# Patient Record
Sex: FEMALE | Race: BLACK OR AFRICAN AMERICAN | Employment: OTHER | URBAN - METROPOLITAN AREA
[De-identification: names, ages, dates, MRNs, and addresses within clinical notes are randomized per-mention and may not be internally consistent; named-entity substitution may affect disease eponyms.]

---

## 2022-07-19 PROBLEM — E66.3 OVERWEIGHT: Status: ACTIVE | Noted: 2020-02-21

## 2022-10-18 RX ORDER — LOSARTAN POTASSIUM 50 MG/1
1 TABLET ORAL NIGHTLY
COMMUNITY

## 2022-10-18 RX ORDER — HYDROXYZINE HYDROCHLORIDE 25 MG/1
25 TABLET, FILM COATED ORAL NIGHTLY
COMMUNITY
Start: 2022-08-17

## 2022-10-18 RX ORDER — LANOLIN ALCOHOL/MO/W.PET/CERES
3 CREAM (GRAM) TOPICAL NIGHTLY
COMMUNITY
Start: 2022-08-17

## 2022-10-18 NOTE — PROGRESS NOTES
[facility] PRE-SURGICAL TESTING INSTRUCTIONS                      PRIOR TO PROCEDURE DATE:    1. PLEASE FOLLOW ANY INSTRUCTIONS GIVEN TO YOU PER YOUR SURGEON. 2. Arrange for someone to drive you home and be with you for the first 24 hours after discharge for your safety after your procedure for which you received sedation. Ensure it is someone we can share information with regarding your discharge. NOTE: At this time ONLY 2 ADULTS may accompany you & everyone must be MASKED. 3. You must contact your surgeon for instructions IF:  You are taking any blood thinners, aspirin, anti-inflammatory or vitamins. There is a change in your physical condition such as a cold, fever, rash, cuts, sores, or any other infection, especially near your surgical site. 4. Do not drink alcohol the day before or day of your procedure. Do not use any recreational marijuana at least 24 hours or street drugs (heroin, cocaine) at minimum 5 days prior to your procedure. 5. A Pre-Surgical History and Physical MUST be completed WITHIN 30 DAYS OR LESS prior to your procedure. by your Physician or an Urgent Care        THE DAY OF YOUR PROCEDURE:  1. Follow instructions for ARRIVAL TIME as DIRECTED BY YOUR SURGEON. 2. Enter the MAIN entrance from [employer] and follow the signs to the free Parking Garage or [employer] (offered free of charge 7 am-5pm). 3. Enter the Main Entrance of the hospital (do not enter from the lower level of the parking garage). Upon entrance, check in with the  at the surgical information desk on your LEFT. Bring your insurance card and photo ID to register      4. DO NOT EAT ANYTHING 8 hours prior to arrival for surgery. You may have up to 8 ounces of water 4 hours prior to your arrival for surgery.    NOTE: ALL Gastric, Bariatric & Bowel surgery patients - you MUST follow your surgeon's instructions regarding eating/ drinking as you will have very specific instructions to follow. If you did not receive these, call your surgeon's office immediately. 5. MEDICATIONS:  Take the following medications with a SMALL sip of water: NORVASC, VICODIN OR FLEXERIL IF NEEDED bring inhaler  Use your usual dose of inhalers the morning of surgery. BRING your rescue inhaler with you to hospital.   Anesthesia does NOT want you to take insulin the morning of surgery. They will control your blood sugar while you are at the hospital. Please contact your ordering physician for instructions regarding your insulin the night before your procedure. If you have an insulin pump, please keep it set on basal rate. Bariatric patient's call your surgeon if on diabetic medications as some may need to be stopped 1 week prior to surgery    6. Do not swallow additional water when brushing teeth. No gum, candy, mints, or ice chips. Refrain from smoking or at least decrease the amount on day of surgery. 7. Morning of surgery:   Take a shower with an antibacterial soap (i.e., Safeguard or Dial) OR your physician may have instructed you to use Hibiclens. Dress in loose, comfortable clothing appropriate for redressing after your procedure. Do not wear jewelry (including body piercings), make-up (especially NO eye make-up), fingernail polish (NO toenail polish if foot/leg surgery), lotion, powders, or metal hairclips. Do not shave or wax for 72 hours prior to procedure near your operative site. Shaving with a razor can irritate your skin and make it easier to develop an infection. On the day of your procedure, any hair that needs to be removed near the surgical site will be 'clipped' by a healthcare worker using a special clipper designed to avoid skin irritation. 8. Dentures, glasses, or contacts will need to be removed before your procedure. Bring cases for your glasses, contacts, dentures, or hearing aids to protect them while you are in surgery.       9. If you use a CPAP, please bring it with you on the day of your procedure. 10. We recommend that valuable personal belongings such as cash, cell phones, e-tablets, or jewelry, be left at home during your stay. The hospital will not be responsible for valuables that are not secured in the hospital safe. However, if your insurance requires a co-pay, you may want to bring a method of payment, i.e., Check or credit card, if you wish to pay your co-pay the day of surgery. 11. If you are to stay overnight, you may bring a bag with personal items. Please have any large items you may need brought in by your family after your arrival to your hospital room. 12. If you have a Living Will or Durable Power of , please bring a copy on the day of your procedure. How we keep you safe and work to prevent surgical site infections:   1. Health care workers should always check your ID bracelet to verify your name and birth date. You will be asked many times to state your name, date of birth, and allergies. 2. Health care workers should always clean their hands with soap or alcohol gel before providing care to you. It is okay to ask anyone if they cleaned their hands before they touch you. 3. You will be actively involved in verifying the type of procedure you are having and ensuring the correct surgical site. This will be confirmed multiple times prior to your procedure. Do NOT hernan your surgery site UNLESS instructed to by your surgeon. 4. When you are in the operating room, your surgical site will be cleansed with a special soap, and in most cases, you will be given an antibiotic before the surgery begins. What to expect AFTER your procedure? 1. Immediately following your procedure, your will be taken to the PACU for the first phase of your recovery. Your nurse will help you recover from any potential side effects of anesthesia, such as extreme drowsiness, changes in your vital signs or breathing patterns.  Nausea, headache, muscle aches, or sore throat may also occur after anesthesia. Your nurse will help you manage these potential side effects. 2. For comfort and safety, arrange to have someone at home with you for the first 24 hours after discharge. 3. You and your family will be given written instructions about your diet, activity, dressing care, medications, and return visits. 4. Once at home, should issues with nausea, pain, or bleeding occur, or should you notice any signs of infection, you should call your surgeon. 5. Always clean your hands before and after caring for your wound. Do not let your family touch your surgery site without cleaning their hands. 6. Narcotic pain medications can cause significant constipation. You may want to add a stool softener to your postoperative medication schedule or speak to your surgeon on how best to manage this SIDE EFFECT. SPECIAL INSTRUCTIONS BRING INHALER AND BRACE, PATIENT ACKNOWLEDGES UNDERSTANDING OF PRE OP INSTRUCTIONS    Thank you for allowing us to care for you. We strive to exceed your expectations in the delivery of care and service provided to you and your family. If you need to contact the [place] [id number] staff for any reason, please call us at [phone]    Instructions reviewed with patient during preadmission testing phone interview.   Ainsley Vega RN.10/18/2022 .11:59 AM      ADDITIONAL EDUCATIONAL INFORMATION REVIEWED PER PHONE WITH YOU AND/OR YOUR FAMILY:  Yes Hibiclens® Bathing Instructions   No Antibacterial Soap

## 2022-10-18 NOTE — PROGRESS NOTES
10/18/2022 1217pm Requested labs done 10/18/2022 from office of Dr. Lawanda Villa [phone] spoke with Kenneth Diaz and she will fax. -db    10/24 Labs received and in [address] [place] [address]. No PT/ PTT or MRSA done. Ordered for DOS and notified Loreto Shay at surgeon office.  Chon Crowley

## 2022-10-18 NOTE — PROGRESS NOTES
Place patient label inside box (if no patient label, complete below)  Name:  :  MR#:   Broadus Felty / PROCEDURE  I (we)Anthony  (Patient Name) authorize German Nuno MD (Provider / Louanne Kayser) and/or such assistants as may be selected by him/her, to perform the following operation/procedure(s): T12-L1, L1-L2 DECOMPRESSION FUSION FIXATION       Note: If unable to obtain consent prior to an emergent procedure, document the emergent reason in the medical record. This procedure has been explained to my (our) satisfaction and included in the explanation was: The intended benefit, nature, and extent of the procedure to be performed; The significant risks involved and the probability of success; Alternative procedures and methods of treatment; The dangers and probable consequences of such alternatives (including no procedure or treatment); The expected consequences of the procedure on my future health; Whether other qualified individuals would be performing important surgical tasks and/or whether  would be present to advise or support the procedure. I (we) understand that there are other risks of infection and other serious complications in the pre-operative/procedural and postoperative/procedural stages of my (our) care. I (we) have asked all of the questions which I (we) thought were important in deciding whether or not to undergo treatment or diagnosis. These questions have been answered to my (our) satisfaction. I (we) understand that no assurance can be given that the procedure will be a success, and no guarantee or warranty of success has been given to me (us). It has been explained to me (us) that during the course of the operation/procedure, unforeseen conditions may be revealed that necessitate extension of the original procedure(s) or different procedure(s) than those set forth in Paragraph 1.  I (we) authorize and request that the above-named physician, his/her assistants or his/her designees, perform procedures as necessary and desirable if deemed to be in my (our) best interest.     Revised 8/2/2021                                                                          Page 1 of 2       I acknowledge that health care personnel may be observing this procedure for the purpose of medical education or other specified purposes as may be necessary as requested and/or approved by my (our) physician. I (we) consent to the disposal by the hospital Pathologist of the removed tissue, parts or organs in accordance with hospital policy. I do ____ do not ____ consent to the use of a local infiltration pain blocking agent that will be used by my provider/surgical provider to help alleviate pain during my procedure. I do ____ do not ____ consent to an emergent blood transfusion in the case of a life-threatening situation that requires blood components to be administered. This consent is valid for 24 hours from the beginning of the procedure. This patient does ____ or does not ____ currently have a DNR status/order. If DNR order is in place, obtain Addendum to the Surgical Consent for ALL Patients with a DNR Order to address jimi-operative status for limited intervention or DNR suspension.      I have read and fully understand the above Consent for Operation/Procedure and that all blanks were completed before I signed the consent.   _____________________________       _____________________      ____/____am/pm  Signature of Patient or legal representative      Printed Name / Relationship            Date / Time   ____________________________       _____________________      ____/____am/pm  Witness to Signature                                    Printed Name                    Date / Time    If patient is unable to sign or is a minor, complete the following)  Patient is a minor, ____ years of age, or unable to sign because: ______________________________________________________________________________________________    If a phone consent is obtained, consent will be documented by using two health care professionals, each affirming that the consenting party has no questions and gives consent for the procedure discussed with the physician/provider.   _____________________          ____________________       _____/_____am/pm   2nd witness to phone consent        Printed name           Date / Time    Informed Consent:  I have provided the explanation described above in section 1 to the patient and/or legal representative.  I have provided the patient and/or legal representative with an opportunity to ask any questions about the proposed operation/procedure.   ___________________________          ____________________         ____/____am/pm  Provider / Proceduralist                            Printed name            Date / Time  Revised 8/2/2021                                                                      Page 2 of 2

## 2022-10-25 ENCOUNTER — ANESTHESIA (OUTPATIENT)
Dept: OPERATING ROOM | Age: 77
End: 2022-10-25
Payer: MEDICARE

## 2022-10-25 ENCOUNTER — HOSPITAL ENCOUNTER (OUTPATIENT)
Age: 77
Setting detail: SURGERY ADMIT
Discharge: HOME | End: 2022-10-25
Attending: NEUROLOGICAL SURGERY | Admitting: NEUROLOGICAL SURGERY
Payer: MEDICARE

## 2022-10-25 ENCOUNTER — ANESTHESIA EVENT (OUTPATIENT)
Dept: OPERATING ROOM | Age: 77
End: 2022-10-25
Payer: MEDICARE

## 2022-10-25 VITALS
DIASTOLIC BLOOD PRESSURE: 74 MMHG | RESPIRATION RATE: 18 BRPM | TEMPERATURE: 98.2 F | BODY MASS INDEX: 32.22 KG/M2 | HEART RATE: 55 BPM | HEIGHT: 65 IN | WEIGHT: 193.4 LBS | SYSTOLIC BLOOD PRESSURE: 140 MMHG | OXYGEN SATURATION: 99 %

## 2022-10-25 LAB
ABO/RH: NORMAL
ANTIBODY SCREEN: NORMAL
APTT: 31.1 SEC (ref 23–34.3)
EKG ATRIAL RATE: 45 BPM
EKG DIAGNOSIS: NORMAL
EKG P AXIS: 59 DEGREES
EKG P-R INTERVAL: 172 MS
EKG Q-T INTERVAL: 486 MS
EKG QRS DURATION: 94 MS
EKG QTC CALCULATION (BAZETT): 420 MS
EKG R AXIS: 10 DEGREES
EKG T AXIS: 25 DEGREES
EKG VENTRICULAR RATE: 45 BPM
INR BLD: 1.05 (ref 0.87–1.14)
PROTHROMBIN TIME: 13.7 SEC (ref 11.7–14.5)

## 2022-10-25 PROCEDURE — 86900 BLOOD TYPING SEROLOGIC ABO: CPT

## 2022-10-25 PROCEDURE — 85610 PROTHROMBIN TIME: CPT

## 2022-10-25 PROCEDURE — 86850 RBC ANTIBODY SCREEN: CPT

## 2022-10-25 PROCEDURE — 93010 ELECTROCARDIOGRAM REPORT: CPT | Performed by: INTERNAL MEDICINE

## 2022-10-25 PROCEDURE — 85730 THROMBOPLASTIN TIME PARTIAL: CPT

## 2022-10-25 PROCEDURE — 87641 MR-STAPH DNA AMP PROBE: CPT

## 2022-10-25 PROCEDURE — 93005 ELECTROCARDIOGRAM TRACING: CPT | Performed by: ANESTHESIOLOGY

## 2022-10-25 PROCEDURE — 86901 BLOOD TYPING SEROLOGIC RH(D): CPT

## 2022-10-25 RX ORDER — SODIUM CHLORIDE 9 MG/ML
INJECTION, SOLUTION INTRAVENOUS PRN
Status: DISCONTINUED | OUTPATIENT
Start: 2022-10-25 | End: 2022-10-25 | Stop reason: HOSPADM

## 2022-10-25 RX ORDER — LIDOCAINE HYDROCHLORIDE 10 MG/ML
1 INJECTION, SOLUTION EPIDURAL; INFILTRATION; INTRACAUDAL; PERINEURAL
Status: DISCONTINUED | OUTPATIENT
Start: 2022-10-25 | End: 2022-10-25 | Stop reason: HOSPADM

## 2022-10-25 RX ORDER — OXYCODONE AND ACETAMINOPHEN 10; 325 MG/1; MG/1
1 TABLET ORAL EVERY 6 HOURS PRN
COMMUNITY

## 2022-10-25 RX ORDER — SODIUM CHLORIDE 0.9 % (FLUSH) 0.9 %
5-40 SYRINGE (ML) INJECTION EVERY 12 HOURS SCHEDULED
Status: DISCONTINUED | OUTPATIENT
Start: 2022-10-25 | End: 2022-10-25 | Stop reason: HOSPADM

## 2022-10-25 RX ORDER — SODIUM CHLORIDE 0.9 % (FLUSH) 0.9 %
5-40 SYRINGE (ML) INJECTION PRN
Status: DISCONTINUED | OUTPATIENT
Start: 2022-10-25 | End: 2022-10-25 | Stop reason: HOSPADM

## 2022-10-25 RX ORDER — SODIUM CHLORIDE, SODIUM LACTATE, POTASSIUM CHLORIDE, CALCIUM CHLORIDE 600; 310; 30; 20 MG/100ML; MG/100ML; MG/100ML; MG/100ML
INJECTION, SOLUTION INTRAVENOUS CONTINUOUS
Status: DISCONTINUED | OUTPATIENT
Start: 2022-10-25 | End: 2022-10-25 | Stop reason: HOSPADM

## 2022-10-25 ASSESSMENT — PAIN DESCRIPTION - DESCRIPTORS: DESCRIPTORS: ACHING

## 2022-10-25 ASSESSMENT — PAIN - FUNCTIONAL ASSESSMENT: PAIN_FUNCTIONAL_ASSESSMENT: 0-10

## 2022-10-25 ASSESSMENT — PAIN SCALES - GENERAL: PAINLEVEL_OUTOF10: 5

## 2022-10-25 NOTE — PROGRESS NOTES
Pt heart rate dropped to 30 when vital signs being assessed. Radial pulse palpated with pulse of 32. Pt also reports tightness in her chest, SOB, and general malaise. Notified Dr. Teresa Garnett of symptoms and he would like EKG and would like to have surgery cancelled. Stat EKG ordered and called placed to cardiology. Dr. Alireza Mark notified.

## 2022-10-25 NOTE — PROGRESS NOTES
OR canceled by Dr Meghna Das for URI symptoms of cough and congestion for 3 days. Patient was also bradycardic with HR down to 32 for a brief time. Charge RN for [facility], Kanika Gale notified of cancellation. Charge for OR, Chiqui and charge anesthesia, Dr Fabi Izaguirre also notified as was DR Rajesh Garcia. VSS now.   Patient discharged to home per order of DR Meghna Das

## 2022-10-25 NOTE — DISCHARGE INSTRUCTIONS
Follow up with cardiologist and primary care physician. May be re-evaluated for surgery again in 4 weeks.

## 2022-10-25 NOTE — ANESTHESIA PRE PROCEDURE
Department of Anesthesiology  Preprocedure Note       Name:  Joie Gallagher   Age:  69 y.o.  :  1945                                          MRN:  [id number]         Date:  10/25/2022      Surgeon: Christelle Vasquez):  James Arriola MD    Procedure: Procedure(s):  T12-L1, L1-L2 DECOMPRESSION FUSION FIXATION    Medications prior to admission:   Prior to Admission medications    Medication Sig Start Date End Date Taking? Authorizing Provider   oxyCODONE-acetaminophen (PERCOCET)  MG per tablet Take 1 tablet by mouth every 6 hours as needed for Pain. Yes Historical Provider, MD   hydrOXYzine HCl (ATARAX) 25 MG tablet Take 25 mg by mouth nightly 22  Yes Historical Provider, MD   melatonin 3 MG TABS tablet Take 3 mg by mouth nightly 22  Yes Historical Provider, MD   losartan (COZAAR) 50 MG tablet Take 1 tablet by mouth at bedtime   Yes Historical Provider, MD   potassium chloride (KLOR-CON) 8 MEQ extended release tablet Take 8 mEq by mouth in the morning and 8 mEq before bedtime. 22   Historical Provider, MD   omeprazole (PRILOSEC) 40 MG delayed release capsule Take 40 mg by mouth daily as needed 9/10/21   Historical Provider, MD   nitroGLYCERIN (NITROSTAT) 0.4 MG SL tablet Place 0.4 mg under the tongue as needed  Patient not taking: Reported on 10/25/2022 5/12/22   Historical Provider, MD   HYDROcodone-acetaminophen (NORCO) 5-325 MG per tablet Take 1 tablet by mouth every 6 hours as needed. Patient not taking: Reported on 10/25/2022    Historical Provider, MD   guaiFENesin (MUCINEX) 600 MG extended release tablet Take 600 mg by mouth 2 times daily as needed    Historical Provider, MD   furosemide (LASIX) 20 MG tablet Take 20 mg by mouth in the morning. 22   Historical Provider, MD   docusate (COLACE, DULCOLAX) 100 MG CAPS Take 100 mg by mouth in the morning.  22   Historical Provider, MD   cyclobenzaprine (FLEXERIL) 10 MG tablet Take 10 mg by mouth 3 times daily as needed 22 Historical Provider, MD   cyanocobalamin 1000 MCG tablet Take 1 tablet by mouth in the morning. Historical Provider, MD   vitamin D (CHOLECALCIFEROL) 25 MCG (1000 UT) TABS tablet Take 25 mcg by mouth daily 5/9/22   Historical Provider, MD   amLODIPine (NORVASC) 10 MG tablet Take 10 mg by mouth in the morning.     Historical Provider, MD   amitriptyline (ELAVIL) 25 MG tablet Take 25 mg by mouth nightly  Patient not taking: Reported on 10/25/2022 2/28/22   Historical Provider, MD   albuterol (PROVENTIL) (2.5 MG/3ML) 0.083% nebulizer solution Inhale 2.5 mg into the lungs every 6 hours as needed 1/24/22   Historical Provider, MD   albuterol sulfate HFA (PROVENTIL;VENTOLIN;PROAIR) 108 (90 Base) MCG/ACT inhaler Inhale 2 puffs into the lungs every 6 hours as needed 5/9/22   Historical Provider, MD   ZINC PO Take by mouth daily    Historical Provider, MD   BIOTIN PO Take 1 tablet by mouth daily    Historical Provider, MD   Ascorbic Acid (VITAMIN C PO) Take by mouth daily    Historical Provider, MD   NONFORMULARY Take by mouth daily [address] Provider, MD   Multiple Vitamin (MULTIVITAMIN PO) Take 1 tablet by mouth daily    Historical Provider, MD   VITAMIN A PO Take by mouth    Historical Provider, MD       Current medications:    Current Facility-Administered Medications   Medication Dose Route Frequency Provider Last Rate Last Admin    ceFAZolin (ANCEF) 2,000 mg in sodium chloride 0.9 % 50 mL IVPB (mini-bag)  2,000 mg IntraVENous Once Fernanda Walls MD        lidocaine PF 1 % injection 1 mL  1 mL IntraDERmal Once PRN Rahel Lee MD        lactated ringers infusion   IntraVENous Continuous Rahel Lee MD        sodium chloride flush 0.9 % injection 5-40 mL  5-40 mL IntraVENous 2 times per day Rahel Lee MD        sodium chloride flush 0.9 % injection 5-40 mL  5-40 mL IntraVENous PRN Rahel Lee MD        0.9 % sodium chloride infusion   IntraVENous PRN Rahel Lee MD           Allergies: Allergies   Allergen Reactions    Atorvastatin Nausea And Vomiting and Nausea Only     Nausea and vomiting      Nsaids Other (See Comments)     Has CKD-3  Has CKD-3    Pravastatin Nausea Only    Simvastatin Nausea Only       Problem List:  There is no problem list on file for this patient.       Past Medical History:        Diagnosis Date    Back pain     Borderline diabetes     Chronic kidney disease     Hypertension     Irregular heart rhythm     Stroke ([facility] [address]     RIGHT EYE- NO RESIDUAL / RESOLVED PER PATIENT    Thyroid disease     Uses walker     &/ OR CANE CURRENTLY DUE TO BACK PAIN    Wears dentures     UPPER ONLY    Wears glasses     READING ONLY       Past Surgical History:        Procedure Laterality Date    CATARACT REMOVAL WITH IMPLANT Bilateral     CERVICAL SPINE SURGERY      X 2 SURGERY - TOTAL OF 5 SCREWS IN NECK PER PATIENT    CHOLECYSTECTOMY      COLONOSCOPY      HYSTERECTOMY (CERVIX STATUS UNKNOWN)      OTHER SURGICAL HISTORY      REMOVAL FATTY TUMOR OFF NECK AND SHOULDER    OTHER SURGICAL HISTORY      FATTY REMOVAL BUTTOCKS        Social History:    Social History     Tobacco Use    Smoking status: Former     Types: Cigarettes     Quit date: 1987     Years since quittin.8    Smokeless tobacco: Never   Substance Use Topics    Alcohol use: Not Currently                                Counseling given: Not Answered      Vital Signs (Current):   Vitals:    10/18/22 1138 10/25/22 0622 10/25/22 0635 10/25/22 0637   BP:  125/84     Pulse:  60 (!) 30 (!) 32   Resp:  18     Temp:  98.2 °F (36.8 °C)     TempSrc:  Temporal     SpO2:  100%     Weight: 195 lb (88.5 kg) 193 lb 6.4 oz (87.7 kg)     Height: 5' 5\" (1.651 m) 5' 5\" (1.651 m)                                                BP Readings from Last 3 Encounters:   10/25/22 125/84       NPO Status:                                                                                 BMI: Wt Readings from Last 3 Encounters:   10/25/22 193 lb 6.4 oz (87.7 kg)     Body mass index is 32.18 kg/m². CBC: No results found for: WBC, RBC, HGB, HCT, MCV, RDW, PLT    CMP: No results found for: NA, K, CL, CO2, BUN, CREATININE, GFRAA, AGRATIO, LABGLOM, GLUCOSE, GLU, PROT, CALCIUM, BILITOT, ALKPHOS, AST, ALT    POC Tests: No results for input(s): POCGLU, POCNA, POCK, POCCL, POCBUN, POCHEMO, POCHCT in the last 72 hours. Coags: No results found for: PROTIME, INR, APTT    HCG (If Applicable): No results found for: PREGTESTUR, PREGSERUM, HCG, HCGQUANT     ABGs: No results found for: PHART, PO2ART, FZD7LIL, POR4RPR, BEART, L3MTWWBC     Type & Screen (If Applicable):  No results found for: LABABO, LABRH    Drug/Infectious Status (If Applicable):  No results found for: HIV, HEPCAB    COVID-19 Screening (If Applicable): No results found for: COVID19        Anesthesia Evaluation  Patient summary reviewed and Nursing notes reviewed no history of anesthetic complications:   Airway:           Dental:          Pulmonary:                             ROS comment: URI currently   Cardiovascular:  Exercise tolerance: good (>4 METS),   (+) hypertension:, dysrhythmias (bradycardia):,                   Neuro/Psych:   (+) CVA:,             GI/Hepatic/Renal:             Endo/Other:                     Abdominal:             Vascular:           Other Findings:           Anesthesia Plan        Teri Heard DO   10/25/2022

## 2022-10-26 LAB — MRSA SCREEN RT-PCR: NORMAL

## 2023-01-26 ENCOUNTER — OFFICE (OUTPATIENT)
Dept: URBAN - METROPOLITAN AREA CLINIC 13 | Facility: CLINIC | Age: 78
End: 2023-01-26
Payer: COMMERCIAL

## 2023-01-26 VITALS — DIASTOLIC BLOOD PRESSURE: 80 MMHG | SYSTOLIC BLOOD PRESSURE: 142 MMHG | WEIGHT: 198 LBS | HEIGHT: 65 IN

## 2023-01-26 DIAGNOSIS — R93.3 ABNORMAL FINDINGS ON DIAGNOSTIC IMAGING OF OTHER PARTS OF DI: ICD-10-CM

## 2023-01-26 PROCEDURE — 99214 OFFICE O/P EST MOD 30 MIN: CPT | Performed by: INTERNAL MEDICINE

## 2023-02-08 ENCOUNTER — AMBULATORY SURGICAL CENTER (OUTPATIENT)
Dept: URBAN - METROPOLITAN AREA SURGERY 4 | Facility: SURGERY | Age: 78
End: 2023-02-08
Payer: COMMERCIAL

## 2023-02-08 VITALS
DIASTOLIC BLOOD PRESSURE: 67 MMHG | SYSTOLIC BLOOD PRESSURE: 130 MMHG | SYSTOLIC BLOOD PRESSURE: 126 MMHG | RESPIRATION RATE: 20 BRPM | SYSTOLIC BLOOD PRESSURE: 159 MMHG | SYSTOLIC BLOOD PRESSURE: 129 MMHG | DIASTOLIC BLOOD PRESSURE: 68 MMHG | RESPIRATION RATE: 19 BRPM | SYSTOLIC BLOOD PRESSURE: 105 MMHG | RESPIRATION RATE: 16 BRPM | DIASTOLIC BLOOD PRESSURE: 56 MMHG | SYSTOLIC BLOOD PRESSURE: 126 MMHG | DIASTOLIC BLOOD PRESSURE: 63 MMHG | TEMPERATURE: 98.8 F | DIASTOLIC BLOOD PRESSURE: 68 MMHG | DIASTOLIC BLOOD PRESSURE: 85 MMHG | HEART RATE: 71 BPM | RESPIRATION RATE: 20 BRPM | HEART RATE: 68 BPM | HEART RATE: 70 BPM | SYSTOLIC BLOOD PRESSURE: 148 MMHG | HEART RATE: 110 BPM | DIASTOLIC BLOOD PRESSURE: 72 MMHG | SYSTOLIC BLOOD PRESSURE: 105 MMHG | DIASTOLIC BLOOD PRESSURE: 63 MMHG | DIASTOLIC BLOOD PRESSURE: 77 MMHG | SYSTOLIC BLOOD PRESSURE: 130 MMHG | RESPIRATION RATE: 25 BRPM | RESPIRATION RATE: 16 BRPM | HEART RATE: 71 BPM | SYSTOLIC BLOOD PRESSURE: 141 MMHG | DIASTOLIC BLOOD PRESSURE: 85 MMHG | RESPIRATION RATE: 19 BRPM | DIASTOLIC BLOOD PRESSURE: 57 MMHG | OXYGEN SATURATION: 100 % | SYSTOLIC BLOOD PRESSURE: 129 MMHG | DIASTOLIC BLOOD PRESSURE: 101 MMHG | HEIGHT: 65 IN | HEIGHT: 65 IN | HEART RATE: 58 BPM | OXYGEN SATURATION: 81 % | SYSTOLIC BLOOD PRESSURE: 148 MMHG | HEART RATE: 70 BPM | OXYGEN SATURATION: 81 % | HEART RATE: 62 BPM | HEART RATE: 66 BPM | RESPIRATION RATE: 10 BRPM | RESPIRATION RATE: 30 BRPM | DIASTOLIC BLOOD PRESSURE: 81 MMHG | RESPIRATION RATE: 25 BRPM | DIASTOLIC BLOOD PRESSURE: 101 MMHG | HEART RATE: 62 BPM | HEART RATE: 110 BPM | OXYGEN SATURATION: 99 % | HEART RATE: 60 BPM | SYSTOLIC BLOOD PRESSURE: 135 MMHG | RESPIRATION RATE: 21 BRPM | HEART RATE: 58 BPM | HEART RATE: 83 BPM | DIASTOLIC BLOOD PRESSURE: 77 MMHG | SYSTOLIC BLOOD PRESSURE: 109 MMHG | DIASTOLIC BLOOD PRESSURE: 72 MMHG | OXYGEN SATURATION: 100 % | SYSTOLIC BLOOD PRESSURE: 141 MMHG | DIASTOLIC BLOOD PRESSURE: 81 MMHG | SYSTOLIC BLOOD PRESSURE: 109 MMHG | DIASTOLIC BLOOD PRESSURE: 56 MMHG | HEART RATE: 97 BPM | DIASTOLIC BLOOD PRESSURE: 53 MMHG | HEART RATE: 60 BPM | DIASTOLIC BLOOD PRESSURE: 57 MMHG | RESPIRATION RATE: 10 BRPM | WEIGHT: 197 LBS | HEART RATE: 66 BPM | RESPIRATION RATE: 30 BRPM | HEART RATE: 68 BPM | DIASTOLIC BLOOD PRESSURE: 53 MMHG | SYSTOLIC BLOOD PRESSURE: 135 MMHG | HEART RATE: 97 BPM | HEART RATE: 65 BPM | DIASTOLIC BLOOD PRESSURE: 67 MMHG | OXYGEN SATURATION: 99 % | TEMPERATURE: 98.8 F | SYSTOLIC BLOOD PRESSURE: 159 MMHG | WEIGHT: 197 LBS | RESPIRATION RATE: 21 BRPM | HEART RATE: 65 BPM | HEART RATE: 83 BPM

## 2023-02-08 DIAGNOSIS — K64.0 FIRST DEGREE HEMORRHOIDS: ICD-10-CM

## 2023-02-08 DIAGNOSIS — D17.79 BENIGN LIPOMATOUS NEOPLASM OF OTHER SITES: ICD-10-CM

## 2023-02-08 DIAGNOSIS — R93.3 ABNORMAL FINDINGS ON DIAGNOSTIC IMAGING OF OTHER PARTS OF DI: ICD-10-CM

## 2023-02-08 DIAGNOSIS — K57.30 DIVERTICULOSIS OF LARGE INTESTINE WITHOUT PERFORATION OR ABS: ICD-10-CM

## 2023-02-08 PROCEDURE — 45378 DIAGNOSTIC COLONOSCOPY: CPT | Performed by: INTERNAL MEDICINE

## 2023-12-05 ENCOUNTER — OFFICE (OUTPATIENT)
Dept: URBAN - METROPOLITAN AREA CLINIC 13 | Facility: CLINIC | Age: 78
End: 2023-12-05

## 2023-12-05 VITALS
HEART RATE: 64 BPM | HEIGHT: 65 IN | DIASTOLIC BLOOD PRESSURE: 80 MMHG | SYSTOLIC BLOOD PRESSURE: 144 MMHG | WEIGHT: 206 LBS

## 2023-12-05 DIAGNOSIS — R10.13 EPIGASTRIC PAIN: ICD-10-CM

## 2023-12-05 DIAGNOSIS — K76.0 FATTY (CHANGE OF) LIVER, NOT ELSEWHERE CLASSIFIED: ICD-10-CM

## 2023-12-05 DIAGNOSIS — Z80.0 FAMILY HISTORY OF MALIGNANT NEOPLASM OF DIGESTIVE ORGANS: ICD-10-CM

## 2023-12-05 PROCEDURE — 99214 OFFICE O/P EST MOD 30 MIN: CPT | Performed by: INTERNAL MEDICINE

## 2024-01-02 ENCOUNTER — AMBULATORY SURGICAL CENTER (OUTPATIENT)
Dept: URBAN - METROPOLITAN AREA SURGERY 4 | Facility: SURGERY | Age: 79
End: 2024-01-02
Payer: COMMERCIAL

## 2024-01-02 ENCOUNTER — AMBULATORY SURGICAL CENTER (OUTPATIENT)
Dept: URBAN - METROPOLITAN AREA SURGERY 4 | Facility: SURGERY | Age: 79
End: 2024-01-02

## 2024-01-02 ENCOUNTER — OFFICE (OUTPATIENT)
Dept: URBAN - METROPOLITAN AREA PATHOLOGY 1 | Facility: PATHOLOGY | Age: 79
End: 2024-01-02
Payer: COMMERCIAL

## 2024-01-02 VITALS
OXYGEN SATURATION: 98 % | DIASTOLIC BLOOD PRESSURE: 72 MMHG | RESPIRATION RATE: 14 BRPM | TEMPERATURE: 97.5 F | DIASTOLIC BLOOD PRESSURE: 78 MMHG | OXYGEN SATURATION: 100 % | RESPIRATION RATE: 12 BRPM | HEIGHT: 65 IN | SYSTOLIC BLOOD PRESSURE: 140 MMHG | HEART RATE: 72 BPM | DIASTOLIC BLOOD PRESSURE: 67 MMHG | SYSTOLIC BLOOD PRESSURE: 168 MMHG | OXYGEN SATURATION: 100 % | HEART RATE: 82 BPM | HEART RATE: 64 BPM | DIASTOLIC BLOOD PRESSURE: 78 MMHG | SYSTOLIC BLOOD PRESSURE: 142 MMHG | HEART RATE: 80 BPM | OXYGEN SATURATION: 98 % | HEIGHT: 65 IN | DIASTOLIC BLOOD PRESSURE: 79 MMHG | HEART RATE: 80 BPM | WEIGHT: 200 LBS | OXYGEN SATURATION: 99 % | SYSTOLIC BLOOD PRESSURE: 153 MMHG | WEIGHT: 200 LBS | DIASTOLIC BLOOD PRESSURE: 79 MMHG | RESPIRATION RATE: 16 BRPM | SYSTOLIC BLOOD PRESSURE: 129 MMHG | SYSTOLIC BLOOD PRESSURE: 172 MMHG | SYSTOLIC BLOOD PRESSURE: 153 MMHG | SYSTOLIC BLOOD PRESSURE: 172 MMHG | RESPIRATION RATE: 16 BRPM | HEART RATE: 77 BPM | OXYGEN SATURATION: 89 % | RESPIRATION RATE: 14 BRPM | OXYGEN SATURATION: 99 % | HEART RATE: 64 BPM | SYSTOLIC BLOOD PRESSURE: 142 MMHG | TEMPERATURE: 97.5 F | DIASTOLIC BLOOD PRESSURE: 72 MMHG | DIASTOLIC BLOOD PRESSURE: 90 MMHG | RESPIRATION RATE: 12 BRPM | OXYGEN SATURATION: 89 % | SYSTOLIC BLOOD PRESSURE: 140 MMHG | HEART RATE: 82 BPM | DIASTOLIC BLOOD PRESSURE: 90 MMHG | DIASTOLIC BLOOD PRESSURE: 87 MMHG | SYSTOLIC BLOOD PRESSURE: 168 MMHG | SYSTOLIC BLOOD PRESSURE: 129 MMHG | HEART RATE: 77 BPM | DIASTOLIC BLOOD PRESSURE: 87 MMHG | HEART RATE: 72 BPM | DIASTOLIC BLOOD PRESSURE: 67 MMHG

## 2024-01-02 DIAGNOSIS — K29.30 CHRONIC SUPERFICIAL GASTRITIS WITHOUT BLEEDING: ICD-10-CM

## 2024-01-02 DIAGNOSIS — R10.13 EPIGASTRIC PAIN: ICD-10-CM

## 2024-01-02 DIAGNOSIS — K31.89 OTHER DISEASES OF STOMACH AND DUODENUM: ICD-10-CM

## 2024-01-02 PROCEDURE — 88342 IMHCHEM/IMCYTCHM 1ST ANTB: CPT | Performed by: PATHOLOGY

## 2024-01-02 PROCEDURE — 43239 EGD BIOPSY SINGLE/MULTIPLE: CPT | Performed by: INTERNAL MEDICINE

## 2024-01-02 PROCEDURE — 88305 TISSUE EXAM BY PATHOLOGIST: CPT | Performed by: PATHOLOGY

## 2024-01-08 LAB
PDF: PDF REPORT: (no result)
PDF: PDF REPORT: (no result)

## 2024-02-02 LAB
LIPASE: 55 U/L
LIPASE: 55 U/L

## 2024-02-13 ENCOUNTER — OFFICE (OUTPATIENT)
Dept: URBAN - METROPOLITAN AREA CLINIC 13 | Facility: CLINIC | Age: 79
End: 2024-02-13
Payer: COMMERCIAL

## 2024-02-13 VITALS
WEIGHT: 207 LBS | OXYGEN SATURATION: 98 % | SYSTOLIC BLOOD PRESSURE: 158 MMHG | HEART RATE: 67 BPM | DIASTOLIC BLOOD PRESSURE: 78 MMHG | HEIGHT: 65 IN

## 2024-02-13 DIAGNOSIS — R10.13 EPIGASTRIC PAIN: ICD-10-CM

## 2024-02-13 PROCEDURE — 99213 OFFICE O/P EST LOW 20 MIN: CPT | Performed by: NURSE PRACTITIONER

## 2025-01-09 ENCOUNTER — OFFICE (OUTPATIENT)
Dept: URBAN - METROPOLITAN AREA CLINIC 13 | Facility: CLINIC | Age: 80
End: 2025-01-09
Payer: MEDICARE

## 2025-01-09 VITALS
SYSTOLIC BLOOD PRESSURE: 132 MMHG | HEIGHT: 65 IN | WEIGHT: 200 LBS | DIASTOLIC BLOOD PRESSURE: 82 MMHG | HEART RATE: 76 BPM

## 2025-01-09 DIAGNOSIS — K76.0 FATTY (CHANGE OF) LIVER, NOT ELSEWHERE CLASSIFIED: ICD-10-CM

## 2025-01-09 DIAGNOSIS — R10.12 LEFT UPPER QUADRANT PAIN: ICD-10-CM

## 2025-01-09 DIAGNOSIS — K57.90 DIVERTICULOSIS OF INTESTINE, PART UNSPECIFIED, WITHOUT PERFO: ICD-10-CM

## 2025-01-09 DIAGNOSIS — R93.89 ABNORMAL FINDINGS ON DIAGNOSTIC IMAGING OF OTHER SPECIFIED B: ICD-10-CM

## 2025-01-09 PROCEDURE — 99214 OFFICE O/P EST MOD 30 MIN: CPT | Performed by: INTERNAL MEDICINE

## 2025-01-16 ENCOUNTER — AMBULATORY SURGICAL CENTER (OUTPATIENT)
Dept: URBAN - METROPOLITAN AREA SURGERY 4 | Facility: SURGERY | Age: 80
End: 2025-01-16
Payer: MEDICARE

## 2025-01-16 VITALS
RESPIRATION RATE: 17 BRPM | HEART RATE: 71 BPM | WEIGHT: 200 LBS | SYSTOLIC BLOOD PRESSURE: 92 MMHG | SYSTOLIC BLOOD PRESSURE: 139 MMHG | RESPIRATION RATE: 24 BRPM | WEIGHT: 200 LBS | RESPIRATION RATE: 25 BRPM | SYSTOLIC BLOOD PRESSURE: 91 MMHG | HEART RATE: 59 BPM | OXYGEN SATURATION: 95 % | DIASTOLIC BLOOD PRESSURE: 51 MMHG | HEART RATE: 66 BPM | SYSTOLIC BLOOD PRESSURE: 151 MMHG | HEART RATE: 81 BPM | HEART RATE: 64 BPM | RESPIRATION RATE: 16 BRPM | SYSTOLIC BLOOD PRESSURE: 110 MMHG | TEMPERATURE: 98.4 F | OXYGEN SATURATION: 100 % | DIASTOLIC BLOOD PRESSURE: 79 MMHG | HEART RATE: 70 BPM | RESPIRATION RATE: 22 BRPM | RESPIRATION RATE: 25 BRPM | SYSTOLIC BLOOD PRESSURE: 110 MMHG | HEART RATE: 73 BPM | DIASTOLIC BLOOD PRESSURE: 79 MMHG | DIASTOLIC BLOOD PRESSURE: 49 MMHG | DIASTOLIC BLOOD PRESSURE: 65 MMHG | HEART RATE: 54 BPM | HEART RATE: 85 BPM | SYSTOLIC BLOOD PRESSURE: 138 MMHG | DIASTOLIC BLOOD PRESSURE: 59 MMHG | HEART RATE: 75 BPM | HEART RATE: 64 BPM | HEART RATE: 81 BPM | OXYGEN SATURATION: 93 % | HEART RATE: 77 BPM | HEART RATE: 85 BPM | SYSTOLIC BLOOD PRESSURE: 154 MMHG | DIASTOLIC BLOOD PRESSURE: 109 MMHG | HEART RATE: 66 BPM | HEART RATE: 75 BPM | SYSTOLIC BLOOD PRESSURE: 131 MMHG | RESPIRATION RATE: 15 BRPM | HEART RATE: 54 BPM | DIASTOLIC BLOOD PRESSURE: 81 MMHG | DIASTOLIC BLOOD PRESSURE: 81 MMHG | TEMPERATURE: 98.4 F | RESPIRATION RATE: 14 BRPM | HEART RATE: 69 BPM | SYSTOLIC BLOOD PRESSURE: 98 MMHG | OXYGEN SATURATION: 93 % | DIASTOLIC BLOOD PRESSURE: 65 MMHG | HEART RATE: 77 BPM | SYSTOLIC BLOOD PRESSURE: 121 MMHG | SYSTOLIC BLOOD PRESSURE: 138 MMHG | RESPIRATION RATE: 22 BRPM | DIASTOLIC BLOOD PRESSURE: 49 MMHG | SYSTOLIC BLOOD PRESSURE: 98 MMHG | RESPIRATION RATE: 18 BRPM | OXYGEN SATURATION: 99 % | HEART RATE: 71 BPM | DIASTOLIC BLOOD PRESSURE: 51 MMHG | OXYGEN SATURATION: 95 % | OXYGEN SATURATION: 99 % | SYSTOLIC BLOOD PRESSURE: 154 MMHG | RESPIRATION RATE: 15 BRPM | DIASTOLIC BLOOD PRESSURE: 56 MMHG | HEART RATE: 59 BPM | SYSTOLIC BLOOD PRESSURE: 139 MMHG | SYSTOLIC BLOOD PRESSURE: 121 MMHG | OXYGEN SATURATION: 98 % | DIASTOLIC BLOOD PRESSURE: 59 MMHG | OXYGEN SATURATION: 97 % | DIASTOLIC BLOOD PRESSURE: 85 MMHG | RESPIRATION RATE: 14 BRPM | SYSTOLIC BLOOD PRESSURE: 97 MMHG | HEART RATE: 73 BPM | DIASTOLIC BLOOD PRESSURE: 74 MMHG | DIASTOLIC BLOOD PRESSURE: 58 MMHG | DIASTOLIC BLOOD PRESSURE: 56 MMHG | RESPIRATION RATE: 17 BRPM | SYSTOLIC BLOOD PRESSURE: 153 MMHG | HEIGHT: 65 IN | DIASTOLIC BLOOD PRESSURE: 58 MMHG | DIASTOLIC BLOOD PRESSURE: 74 MMHG | HEART RATE: 70 BPM | SYSTOLIC BLOOD PRESSURE: 131 MMHG | SYSTOLIC BLOOD PRESSURE: 151 MMHG | RESPIRATION RATE: 18 BRPM | OXYGEN SATURATION: 98 % | RESPIRATION RATE: 24 BRPM | HEIGHT: 65 IN | DIASTOLIC BLOOD PRESSURE: 85 MMHG | SYSTOLIC BLOOD PRESSURE: 91 MMHG | SYSTOLIC BLOOD PRESSURE: 92 MMHG | DIASTOLIC BLOOD PRESSURE: 109 MMHG | HEART RATE: 69 BPM | RESPIRATION RATE: 16 BRPM | OXYGEN SATURATION: 97 % | SYSTOLIC BLOOD PRESSURE: 97 MMHG | SYSTOLIC BLOOD PRESSURE: 153 MMHG | OXYGEN SATURATION: 100 %

## 2025-01-16 DIAGNOSIS — R93.3 ABNORMAL FINDINGS ON DIAGNOSTIC IMAGING OF OTHER PARTS OF DI: ICD-10-CM

## 2025-01-16 DIAGNOSIS — Z86.0101 PERSONAL HISTORY OF ADENOMATOUS AND SERRATED COLON POLYPS: ICD-10-CM

## 2025-01-16 DIAGNOSIS — K64.1 SECOND DEGREE HEMORRHOIDS: ICD-10-CM

## 2025-01-16 DIAGNOSIS — R10.13 EPIGASTRIC PAIN: ICD-10-CM

## 2025-01-16 DIAGNOSIS — K31.7 POLYP OF STOMACH AND DUODENUM: ICD-10-CM

## 2025-01-16 DIAGNOSIS — R10.12 LEFT UPPER QUADRANT PAIN: ICD-10-CM

## 2025-01-16 DIAGNOSIS — K57.30 DIVERTICULOSIS OF LARGE INTESTINE WITHOUT PERFORATION OR ABS: ICD-10-CM

## 2025-01-16 DIAGNOSIS — D17.79 BENIGN LIPOMATOUS NEOPLASM OF OTHER SITES: ICD-10-CM

## 2025-01-16 DIAGNOSIS — D3A.8 OTHER BENIGN NEUROENDOCRINE TUMORS: ICD-10-CM

## 2025-01-16 PROCEDURE — 45378 DIAGNOSTIC COLONOSCOPY: CPT | Performed by: INTERNAL MEDICINE

## 2025-01-16 PROCEDURE — 43251 EGD REMOVE LESION SNARE: CPT | Performed by: INTERNAL MEDICINE

## 2025-01-16 PROCEDURE — 43251 EGD REMOVE LESION SNARE: CPT | Mod: 51 | Performed by: INTERNAL MEDICINE

## 2025-01-16 NOTE — SERVICEHPINOTES
The patient presents for colonoscopic evaluation of    abnormal findings on a recent x-ray/CT evaluation of the GI tract  .   The patient is seen for EGD evaluation of   epigastric pain and LUQ pain  . 2 years ago a PET scan showed uptake in the sigmoid colon. A colonoscopy was done and only diverticulosis was found however the prep was poor in the sigmoid and I recommended a 1 year recall but she says she was never notified.

## 2025-01-28 ENCOUNTER — OFFICE (OUTPATIENT)
Dept: URBAN - METROPOLITAN AREA CLINIC 13 | Facility: CLINIC | Age: 80
End: 2025-01-28
Payer: MEDICARE

## 2025-01-28 VITALS
WEIGHT: 200 LBS | DIASTOLIC BLOOD PRESSURE: 76 MMHG | OXYGEN SATURATION: 100 % | HEART RATE: 80 BPM | HEIGHT: 65 IN | SYSTOLIC BLOOD PRESSURE: 138 MMHG

## 2025-01-28 DIAGNOSIS — D3A.8 OTHER BENIGN NEUROENDOCRINE TUMORS: ICD-10-CM

## 2025-01-28 PROCEDURE — 99214 OFFICE O/P EST MOD 30 MIN: CPT | Performed by: INTERNAL MEDICINE

## 2025-03-26 ENCOUNTER — AMBULATORY SURGICAL CENTER (OUTPATIENT)
Dept: URBAN - METROPOLITAN AREA SURGERY 7 | Facility: SURGERY | Age: 80
End: 2025-03-26
Payer: COMMERCIAL

## 2025-03-26 VITALS
HEIGHT: 65 IN | RESPIRATION RATE: 12 BRPM | SYSTOLIC BLOOD PRESSURE: 158 MMHG | OXYGEN SATURATION: 99 % | RESPIRATION RATE: 11 BRPM | RESPIRATION RATE: 10 BRPM | HEART RATE: 80 BPM | HEART RATE: 87 BPM | DIASTOLIC BLOOD PRESSURE: 55 MMHG | HEART RATE: 62 BPM | RESPIRATION RATE: 9 BRPM | HEART RATE: 78 BPM | OXYGEN SATURATION: 98 % | SYSTOLIC BLOOD PRESSURE: 124 MMHG | DIASTOLIC BLOOD PRESSURE: 88 MMHG | RESPIRATION RATE: 11 BRPM | SYSTOLIC BLOOD PRESSURE: 122 MMHG | DIASTOLIC BLOOD PRESSURE: 88 MMHG | RESPIRATION RATE: 20 BRPM | SYSTOLIC BLOOD PRESSURE: 144 MMHG | RESPIRATION RATE: 10 BRPM | SYSTOLIC BLOOD PRESSURE: 124 MMHG | HEART RATE: 67 BPM | WEIGHT: 201 LBS | DIASTOLIC BLOOD PRESSURE: 70 MMHG | DIASTOLIC BLOOD PRESSURE: 75 MMHG | TEMPERATURE: 98.2 F | DIASTOLIC BLOOD PRESSURE: 83 MMHG | SYSTOLIC BLOOD PRESSURE: 175 MMHG | DIASTOLIC BLOOD PRESSURE: 65 MMHG | HEART RATE: 62 BPM | SYSTOLIC BLOOD PRESSURE: 159 MMHG | SYSTOLIC BLOOD PRESSURE: 171 MMHG | HEART RATE: 67 BPM | HEART RATE: 58 BPM | DIASTOLIC BLOOD PRESSURE: 75 MMHG | DIASTOLIC BLOOD PRESSURE: 73 MMHG | HEART RATE: 54 BPM | RESPIRATION RATE: 12 BRPM | OXYGEN SATURATION: 93 % | SYSTOLIC BLOOD PRESSURE: 127 MMHG | DIASTOLIC BLOOD PRESSURE: 83 MMHG | SYSTOLIC BLOOD PRESSURE: 142 MMHG | DIASTOLIC BLOOD PRESSURE: 70 MMHG | DIASTOLIC BLOOD PRESSURE: 94 MMHG | HEART RATE: 63 BPM | HEART RATE: 63 BPM | HEART RATE: 55 BPM | HEART RATE: 55 BPM | DIASTOLIC BLOOD PRESSURE: 94 MMHG | SYSTOLIC BLOOD PRESSURE: 145 MMHG | DIASTOLIC BLOOD PRESSURE: 73 MMHG | RESPIRATION RATE: 20 BRPM | SYSTOLIC BLOOD PRESSURE: 127 MMHG | SYSTOLIC BLOOD PRESSURE: 122 MMHG | HEART RATE: 80 BPM | OXYGEN SATURATION: 93 % | HEART RATE: 54 BPM | OXYGEN SATURATION: 98 % | SYSTOLIC BLOOD PRESSURE: 159 MMHG | DIASTOLIC BLOOD PRESSURE: 65 MMHG | SYSTOLIC BLOOD PRESSURE: 137 MMHG | WEIGHT: 201 LBS | RESPIRATION RATE: 16 BRPM | HEART RATE: 78 BPM | RESPIRATION RATE: 16 BRPM | TEMPERATURE: 98.2 F | HEART RATE: 87 BPM | SYSTOLIC BLOOD PRESSURE: 171 MMHG | HEART RATE: 76 BPM | DIASTOLIC BLOOD PRESSURE: 87 MMHG | OXYGEN SATURATION: 100 % | OXYGEN SATURATION: 100 % | HEIGHT: 65 IN | SYSTOLIC BLOOD PRESSURE: 142 MMHG | SYSTOLIC BLOOD PRESSURE: 158 MMHG | SYSTOLIC BLOOD PRESSURE: 137 MMHG | RESPIRATION RATE: 18 BRPM | SYSTOLIC BLOOD PRESSURE: 144 MMHG | RESPIRATION RATE: 9 BRPM | HEART RATE: 76 BPM | SYSTOLIC BLOOD PRESSURE: 145 MMHG | OXYGEN SATURATION: 99 % | DIASTOLIC BLOOD PRESSURE: 87 MMHG | RESPIRATION RATE: 15 BRPM | HEART RATE: 58 BPM | RESPIRATION RATE: 15 BRPM | SYSTOLIC BLOOD PRESSURE: 175 MMHG | RESPIRATION RATE: 18 BRPM | DIASTOLIC BLOOD PRESSURE: 55 MMHG

## 2025-03-26 DIAGNOSIS — D3A.8 OTHER BENIGN NEUROENDOCRINE TUMORS: ICD-10-CM

## 2025-03-26 DIAGNOSIS — C7A.8 OTHER MALIGNANT NEUROENDOCRINE TUMORS: ICD-10-CM

## 2025-03-26 PROCEDURE — 43259 EGD US EXAM DUODENUM/JEJUNUM: CPT | Performed by: INTERNAL MEDICINE

## 2025-04-23 ENCOUNTER — OFFICE (OUTPATIENT)
Dept: URBAN - METROPOLITAN AREA CLINIC 13 | Facility: CLINIC | Age: 80
End: 2025-04-23
Payer: MEDICARE

## 2025-04-23 VITALS — HEIGHT: 65 IN | WEIGHT: 197 LBS | SYSTOLIC BLOOD PRESSURE: 118 MMHG | DIASTOLIC BLOOD PRESSURE: 70 MMHG

## 2025-04-23 DIAGNOSIS — D3A.8 OTHER BENIGN NEUROENDOCRINE TUMORS: ICD-10-CM

## 2025-04-23 DIAGNOSIS — K57.30 DIVERTICULOSIS OF LARGE INTESTINE WITHOUT PERFORATION OR ABS: ICD-10-CM

## 2025-04-23 PROCEDURE — 99214 OFFICE O/P EST MOD 30 MIN: CPT | Performed by: INTERNAL MEDICINE

## (undated) DEVICE — DRAPE MICSCP W54XL150IN W/ 4 BINOC GLS LENS LEICA

## (undated) DEVICE — GLOVE SURG SZ 85 L12IN FNGR THK87MIL DK GRN LTX FREE ISOLEX

## (undated) DEVICE — GLOVE SURG SZ 7 CRM LTX FREE POLYISOPRENE POLYMER BEAD ANTI

## (undated) DEVICE — NEURO SPONGES: Brand: DEROYAL

## (undated) DEVICE — SUTURE VCRL SZ 3-0 L18IN ABSRB UD L26MM SH 1/2 CIR J864D

## (undated) DEVICE — SPONGE,NEURO,0.5"X3",XR,STRL,LF,10/PK: Brand: MEDLINE

## (undated) DEVICE — NEURO FUSION ADD-ON PACK: Brand: MEDLINE INDUSTRIES, INC.

## (undated) DEVICE — 3M™ TEGADERM™ TRANSPARENT FILM DRESSING FRAME STYLE, 1626W, 4 IN X 4-3/4 IN (10 CM X 12 CM), 50/CT 4CT/CASE: Brand: 3M™ TEGADERM™

## (undated) DEVICE — SYRINGE MED 10ML LUERLOCK TIP W/O SFTY DISP

## (undated) DEVICE — SUTURE VCRL SZ 0 L18IN ABSRB UD L36MM CT-1 1/2 CIR J840D

## (undated) DEVICE — GLOVE SURG SZ 85 L12IN FNGR THK79MIL GRN LTX FREE

## (undated) DEVICE — ADHESIVE SKIN CLSR 0.7ML TOP DERMBND ADV

## (undated) DEVICE — TOWEL,STOP FLAG GOLD N-W: Brand: MEDLINE

## (undated) DEVICE — SUTURE VCRL SZ 2-0 L18IN ABSRB UD CT-1 L36MM 1/2 CIR J839D

## (undated) DEVICE — PROTECTOR ULN NRV PUR FOAM HK LOOP STRP ANATOMICALLY

## (undated) DEVICE — SUTURE MCRYL SZ 4-0 L27IN ABSRB UD L19MM PS-2 1/2 CIR PRIM Y426H

## (undated) DEVICE — PAD,NON-ADHERENT,3X8,STERILE,LF,1/PK: Brand: MEDLINE

## (undated) DEVICE — GLOVE SURG SZ 7 L12IN FNGR THK79MIL GRN LTX FREE

## (undated) DEVICE — SOLUTION IV 1000ML 0.9% SOD CHL

## (undated) DEVICE — APPLICATOR PREP 26ML 0.7% IOD POVACRYLEX 74% ISO ALC ST

## (undated) DEVICE — LAMINECTOMY: Brand: MEDLINE INDUSTRIES, INC.

## (undated) DEVICE — ELECTRODE PT RET AD L9FT HI MOIST COND ADH HYDRGEL CORDED

## (undated) DEVICE — TOOL 14MH30 LEGEND 14CM 3MM: Brand: MIDAS REX ™